# Patient Record
Sex: MALE | Race: WHITE | NOT HISPANIC OR LATINO | Employment: FULL TIME | URBAN - METROPOLITAN AREA
[De-identification: names, ages, dates, MRNs, and addresses within clinical notes are randomized per-mention and may not be internally consistent; named-entity substitution may affect disease eponyms.]

---

## 2020-08-10 ENCOUNTER — TELEPHONE (OUTPATIENT)
Dept: CARDIOLOGY | Facility: MEDICAL CENTER | Age: 50
End: 2020-08-10

## 2020-08-10 NOTE — TELEPHONE ENCOUNTER
Called patient to see if he has followed with a cardiologist in the past to get records prior to new patient appt with [facility]. Unable to reach patient, left voicemail for call back.     Of note, appointment notes state that patient was seen at [facility]. Faxed STAT records request to [facility] (F: [phone], P: [phone]). Fax confirmation received.

## 2020-08-11 NOTE — TELEPHONE ENCOUNTER
Records received from [facility]. Will be given to  for review then scanned into media.

## 2020-08-17 ENCOUNTER — OFFICE VISIT (OUTPATIENT)
Dept: CARDIOLOGY | Facility: MEDICAL CENTER | Age: 50
End: 2020-08-17
Payer: COMMERCIAL

## 2020-08-17 VITALS
HEIGHT: 76 IN | SYSTOLIC BLOOD PRESSURE: 110 MMHG | WEIGHT: 203 LBS | HEART RATE: 72 BPM | OXYGEN SATURATION: 95 % | DIASTOLIC BLOOD PRESSURE: 80 MMHG | BODY MASS INDEX: 24.72 KG/M2 | RESPIRATION RATE: 12 BRPM

## 2020-08-17 DIAGNOSIS — I10 ESSENTIAL HYPERTENSION, BENIGN: ICD-10-CM

## 2020-08-17 DIAGNOSIS — I44.1 MOBITZ TYPE 1 SECOND DEGREE ATRIOVENTRICULAR BLOCK: ICD-10-CM

## 2020-08-17 DIAGNOSIS — Z95.0 CARDIAC PACEMAKER IN SITU: ICD-10-CM

## 2020-08-17 LAB — EKG IMPRESSION: NORMAL

## 2020-08-17 PROCEDURE — 93000 ELECTROCARDIOGRAM COMPLETE: CPT | Performed by: INTERNAL MEDICINE

## 2020-08-17 PROCEDURE — 99204 OFFICE O/P NEW MOD 45 MIN: CPT | Mod: 25 | Performed by: INTERNAL MEDICINE

## 2020-08-17 RX ORDER — AMLODIPINE BESYLATE 2.5 MG/1
2.5 TABLET ORAL DAILY
COMMUNITY

## 2020-08-17 ASSESSMENT — ENCOUNTER SYMPTOMS
DIARRHEA: 0
IRREGULAR HEARTBEAT: 0
CONSTIPATION: 0
BLURRED VISION: 0
WEIGHT GAIN: 0
FEVER: 0
SHORTNESS OF BREATH: 0
NEAR-SYNCOPE: 0
NAUSEA: 0
DYSPNEA ON EXERTION: 0
DEPRESSION: 0
PALPITATIONS: 0
PND: 0
ABDOMINAL PAIN: 0
SYNCOPE: 0
FLANK PAIN: 0
DECREASED APPETITE: 0
BACK PAIN: 0
CLAUDICATION: 0
COUGH: 0
HEARTBURN: 0
ORTHOPNEA: 0
WEIGHT LOSS: 0
ALTERED MENTAL STATUS: 0
VOMITING: 0
DIZZINESS: 0

## 2020-08-17 NOTE — PROGRESS NOTES
Cardiology Note    Chief Complaint   Patient presents with   • Hypertension       History of Present Illness: Collin Samano is a 49 y.o. male PMH HTN, syncope associated with 2nd degree AVB mobitz 1, bradycardia s/p PPM c/b pneumothorax resolved post chest tube who presents to establish care.    Previously seen by cardiology Dr Roel Vidal. Interrogation 3/2019 [facility] XR  MRI [id number]  <0.1%, AP 0.2%.    Walks daily 2-5 miles. Got a bike; although laments electric bike so not so physical. Stays active without cardiac symptoms. No repeat syncope since ppm. Denies toxic social habits. Lost about 100 lbs with diet and exercise. Mother had MI in 40s received PCI and hx DM. Had lipids earlier this year which he will forward.     Review of Systems   Constitution: Negative for decreased appetite, fever, malaise/fatigue, weight gain and weight loss.   HENT: Negative for congestion and nosebleeds.    Eyes: Negative for blurred vision.   Cardiovascular: Negative for chest pain, claudication, dyspnea on exertion, irregular heartbeat, leg swelling, near-syncope, orthopnea, palpitations, paroxysmal nocturnal dyspnea and syncope.   Respiratory: Negative for cough and shortness of breath.    Endocrine: Negative for cold intolerance and heat intolerance.   Skin: Negative for rash.   Musculoskeletal: Negative for back pain.   Gastrointestinal: Negative for abdominal pain, constipation, diarrhea, heartburn, melena, nausea and vomiting.   Genitourinary: Negative for dysuria, flank pain and hematuria.   Neurological: Negative for dizziness.   Psychiatric/Behavioral: Negative for altered mental status and depression.         Past Medical History:   Diagnosis Date   • Hypertension          No past surgical history on file.      Current Outpatient Medications   Medication Sig Dispense Refill   • amLODIPine (NORVASC) 2.5 MG Tab Take 2.5 mg by mouth every day.     • Multiple Vitamins-Minerals (DAILY MULTI VITAMIN/MINERALS PO)  "Take  by mouth.       No current facility-administered medications for this visit.          No Known Allergies      No family history on file.      Social History     Socioeconomic History   • Marital status:      Spouse name: Not on file   • Number of children: Not on file   • Years of education: Not on file   • Highest education level: Not on file   Occupational History   • Not on file   Social Needs   • Financial resource strain: Not on file   • Food insecurity     Worry: Not on file     Inability: Not on file   • Transportation needs     Medical: Not on file     Non-medical: Not on file   Tobacco Use   • Smoking status: Never Smoker   • Smokeless tobacco: Never Used   Substance and Sexual Activity   • Alcohol use: Not on file   • Drug use: Not on file   • Sexual activity: Not on file   Lifestyle   • Physical activity     Days per week: Not on file     Minutes per session: Not on file   • Stress: Not on file   Relationships   • Social connections     Talks on phone: Not on file     Gets together: Not on file     Attends [employer] service: Not on file     Active member of club or organization: Not on file     Attends meetings of clubs or organizations: Not on file     Relationship status: Not on file   • Intimate partner violence     Fear of current or ex partner: Not on file     Emotionally abused: Not on file     Physically abused: Not on file     Forced sexual activity: Not on file   Other Topics Concern   • Not on file   Social History Narrative   • Not on file         Physical Exam:  Ambulatory Vitals  /80 (BP Location: Left arm, Patient Position: Sitting, BP Cuff Size: Adult)   Pulse 72   Resp 12   Ht 1.93 m (6' 4\")   Wt 92.1 kg (203 lb)   SpO2 95%    BP Readings from Last 4 Encounters:   08/17/20 110/80   08/31/07 161/98     Weight/BMI:   Vitals:    08/17/20 1129   BP: 110/80   Weight: 92.1 kg (203 lb)   Height: 1.93 m (6' 4\")    Body mass index is 24.71 kg/m².  Wt Readings from Last 4 " Encounters:   08/17/20 92.1 kg (203 lb)   08/31/07 127 kg (280 lb)       Physical Exam   Constitutional: He is oriented to person, place, and time and well-developed, well-nourished, and in no distress. No distress.   HENT:   Head: Normocephalic and atraumatic.   Eyes: Pupils are equal, round, and reactive to light. Conjunctivae are normal.   Neck: Normal range of motion. Neck supple. No JVD present.   Cardiovascular: Normal rate, regular rhythm, normal heart sounds and intact distal pulses. Exam reveals no gallop and no friction rub.   No murmur heard.  Pulmonary/Chest: Effort normal and breath sounds normal. No respiratory distress. He has no wheezes. He has no rales. He exhibits no tenderness.   Abdominal: Soft. Bowel sounds are normal. He exhibits no distension.   Musculoskeletal:         General: No edema.   Neurological: He is alert and oriented to person, place, and time.   Skin: Skin is warm and dry.   Psychiatric: Affect and judgment normal.       Lab Data Review:  No results found for: CHOLSTRLTOT, LDL, HDL, TRIGLYCERIDE    Lab Results   Component Value Date/Time    SODIUM 138 08/31/2007 12:20 PM    POTASSIUM 4.5 08/31/2007 12:20 PM    CHLORIDE 106 08/31/2007 12:20 PM    CO2 24 08/31/2007 12:20 PM    GLUCOSE 148 (H) 08/31/2007 12:20 PM    BUN 15 08/31/2007 12:20 PM    CREATININE 1.0 08/31/2007 12:20 PM     CrCl cannot be calculated (Patient's most recent lab result is older than the maximum 7 days allowed.).  No results found for: ALKPHOSPHAT, ASTSGOT, ALTSGPT, TBILIRUBIN   Lab Results   Component Value Date/Time    WBC 13.3 (H) 08/31/2007 12:20 PM     No results found for: HBA1C  No components found for: TROP      Cardiac Imaging and Procedures Review:      EKG 8/17/20 reviewed by me normal sinus    Outside TTE 3/1/2019 [facility]  -normal LVEF 70-75%, normal diastolic, normal LV, normal RV, normal echo    Medical Decision Making:  Problem List Items Addressed This Visit     Essential hypertension, benign     Relevant Medications    amLODIPine (NORVASC) 2.5 MG Tab    Other Relevant Orders    EKG (Completed)    Mobitz type 1 second degree atrioventricular block    Relevant Medications    amLODIPine (NORVASC) 2.5 MG Tab    Cardiac pacemaker in situ        HTN - likely doesn't need amlodipine. Obtain lipids. If 10 year risk ascvd <10%, which is likely, then BP goal 140/90 in which case low dose amlodipine can d/c.     PPM/syncope/mobitz 1 - regular interrogations. Appears syncope resolved post PPM.    Continue cardiac healthy lifestyle habits. 50 min spent reviewing outside studies.    It was my pleasure to meet with  Samano.

## 2020-08-19 ENCOUNTER — TELEPHONE (OUTPATIENT)
Dept: CARDIOLOGY | Facility: MEDICAL CENTER | Age: 50
End: 2020-08-19

## 2020-08-19 NOTE — TELEPHONE ENCOUNTER
Called patient to inform him that [facility] placed orders for him to complete a lipid panel. Left voicemail asking him to fast for these labs and letting him know that I sent him the lab orders in the mail. Asked that he call back with any questions or concerns.